# Patient Record
Sex: MALE | Race: BLACK OR AFRICAN AMERICAN | NOT HISPANIC OR LATINO | URBAN - METROPOLITAN AREA
[De-identification: names, ages, dates, MRNs, and addresses within clinical notes are randomized per-mention and may not be internally consistent; named-entity substitution may affect disease eponyms.]

---

## 2023-11-17 ENCOUNTER — TELEPHONE (OUTPATIENT)
Dept: TRANSPLANT | Facility: HOSPITAL | Age: 53
End: 2023-11-17

## 2023-12-29 ENCOUNTER — TELEPHONE (OUTPATIENT)
Dept: TRANSPLANT | Facility: HOSPITAL | Age: 53
End: 2023-12-29

## 2023-12-29 NOTE — TELEPHONE ENCOUNTER
Was unable to locate records of previous lab work for possible addition of time for Kidney Wait List.

## 2025-02-19 ENCOUNTER — TELEPHONE (OUTPATIENT)
Facility: HOSPITAL | Age: 55
End: 2025-02-19
Payer: COMMERCIAL

## 2025-02-19 ENCOUNTER — DOCUMENTATION (OUTPATIENT)
Facility: HOSPITAL | Age: 55
End: 2025-02-19
Payer: COMMERCIAL

## 2025-03-07 ENCOUNTER — DOCUMENTATION (OUTPATIENT)
Facility: HOSPITAL | Age: 55
End: 2025-03-07
Payer: COMMERCIAL

## 2025-03-07 NOTE — PROGRESS NOTES
Three attempts made to contact pt to see if he is interested in kidney transplant.    No answer or return call.    Plan is to send a 30-day no contact letter.

## 2025-05-02 ENCOUNTER — DOCUMENTATION (OUTPATIENT)
Facility: HOSPITAL | Age: 55
End: 2025-05-02
Payer: COMMERCIAL

## 2025-07-24 ENCOUNTER — DOCUMENTATION (OUTPATIENT)
Facility: HOSPITAL | Age: 55
End: 2025-07-24
Payer: COMMERCIAL

## 2025-07-24 NOTE — PROGRESS NOTES
Multiple attempts made to contact pt to discuss kidney txp waitlist status with no response.    Will discuss for delisting.

## 2025-07-25 ENCOUNTER — TELEPHONE (OUTPATIENT)
Facility: HOSPITAL | Age: 55
End: 2025-07-25
Payer: COMMERCIAL

## 2025-07-25 NOTE — TELEPHONE ENCOUNTER
Called dialysis unit - they do not have a record of Mr. Hardy at their unit in [place].    Per discussion with Dr. Noonan on 07/24, okay to [facility] patient due to no contact.    Will place on consent agenda next week to [facility].

## 2025-08-01 ENCOUNTER — COMMITTEE REVIEW (OUTPATIENT)
Facility: HOSPITAL | Age: 55
End: 2025-08-01
Payer: COMMERCIAL

## 2025-08-01 ENCOUNTER — DOCUMENTATION (OUTPATIENT)
Facility: HOSPITAL | Age: 55
End: 2025-08-01
Payer: COMMERCIAL

## 2025-08-01 NOTE — PROGRESS NOTES
No call placed to pt upon delisting due to not having a contact number available.    Letter not sent to dialysis unit because I confirmed previously that he no longer does dialysis at the [place] location.    Letter sent to address we have on file for patient.

## 2025-08-01 NOTE — COMMITTEE REVIEW
Evaluation Date: 7/24/2018   Committee Review Date: 7/31/2025   Organ being evaluated for: Kidney     Transplant Phase:  Waitlist   Transplant Status: Inactive     Referring Physician:     Transplant Physician:       Primary Diagnosis: Diabetes Mellitus - Type II     Committee Members:   Norma Mcintosh RDN, LD   Juan Silva Peggy, MT    Tania Belcher [facility]   Transplant Jackson, Colletta, RN; Louise Becker RN; Jaci Frausto RN; Alia Byers RN   Transplant Nephrology Kennedy Mcmahan MD; Nadine Moeller MD   Transplant Surgery El Feldman MD; Deja Soto MD; Pasquale Noonan MD       Eligibility:  None     Relative contraindications:  None     Absolute contraindications:  None         Committee Review Decision:    The candidate's evaluation was presented and discussed at the Transplant Multidisciplinary Selection Conference. After review of the candidate's diagnosis and the evaluations of the multidisciplinary team members, the committee made the following recommendations:       Resolution:  Delist patient due to inability to contact after multiple attempts and also reaching out to the dialysis unit of record.

## 2025-08-01 NOTE — LETTER
08/01/25    [facility]ud [facility]  [address] [facility] [id number]    Re: Kidney Transplant [facility] Waitlist Status    Dear Arnaud,    The Kidney Transplant Patient Selection Committee of [facility] [facility] met on 7/31/2025 to discuss your current medical status. We sincerely regret that at this time, we cannot continue to offer transplantation as a treatment option for your organ failure and therefore, you are removed from the waiting list effective 08/01/2025.  This determination was based on our inability to contact you.     We understand that this decision may be difficult to accept and we appreciate the trust that you have placed with us during your waiting time.  It was a privilege to care for you and we wish you well.  If you have any questions about the committee’s determination, please do not hesitate to contact me at [phone]. Attached is a letter from the [employer] ([employer]).  It describes the services and information offered to patients by [employer] and the [employer].    Sincerely,    The Kidney Transplant Team